# Patient Record
Sex: FEMALE | Race: WHITE | Employment: STUDENT | URBAN - METROPOLITAN AREA
[De-identification: names, ages, dates, MRNs, and addresses within clinical notes are randomized per-mention and may not be internally consistent; named-entity substitution may affect disease eponyms.]

---

## 2024-11-16 ENCOUNTER — HOSPITAL ENCOUNTER (EMERGENCY)
Facility: HOSPITAL | Age: 24
Discharge: HOME | End: 2024-11-17
Attending: EMERGENCY MEDICINE
Payer: COMMERCIAL

## 2024-11-16 ENCOUNTER — APPOINTMENT (OUTPATIENT)
Dept: GENERAL RADIOLOGY | Facility: HOSPITAL | Age: 24
End: 2024-11-16
Attending: EMERGENCY MEDICINE
Payer: COMMERCIAL

## 2024-11-16 DIAGNOSIS — E03.9 HYPOTHYROIDISM, UNSPECIFIED TYPE: ICD-10-CM

## 2024-11-16 DIAGNOSIS — I47.11 INAPPROPRIATE SINUS TACHYCARDIA (HCC): Primary | ICD-10-CM

## 2024-11-16 LAB
ALBUMIN SERPL-MCNC: 4.3 G/DL (ref 3.2–4.8)
ALBUMIN/GLOB SERPL: 1.3 {RATIO} (ref 1–2)
ALP LIVER SERPL-CCNC: 47 U/L
ALT SERPL-CCNC: 30 U/L
ANION GAP SERPL CALC-SCNC: 5 MMOL/L (ref 0–18)
AST SERPL-CCNC: 23 U/L (ref ?–34)
BASOPHILS # BLD AUTO: 0.07 X10(3) UL (ref 0–0.2)
BASOPHILS NFR BLD AUTO: 1 %
BILIRUB SERPL-MCNC: 0.3 MG/DL (ref 0.3–1.2)
BUN BLD-MCNC: 12 MG/DL (ref 9–23)
CALCIUM BLD-MCNC: 10 MG/DL (ref 8.7–10.4)
CHLORIDE SERPL-SCNC: 109 MMOL/L (ref 98–112)
CO2 SERPL-SCNC: 24 MMOL/L (ref 21–32)
CREAT BLD-MCNC: 0.65 MG/DL
D DIMER PPP FEU-MCNC: <0.27 UG/ML FEU (ref ?–0.5)
EGFRCR SERPLBLD CKD-EPI 2021: 126 ML/MIN/1.73M2 (ref 60–?)
EOSINOPHIL # BLD AUTO: 0.15 X10(3) UL (ref 0–0.7)
EOSINOPHIL NFR BLD AUTO: 2.1 %
ERYTHROCYTE [DISTWIDTH] IN BLOOD BY AUTOMATED COUNT: 12.8 %
GLOBULIN PLAS-MCNC: 3.3 G/DL (ref 2–3.5)
GLUCOSE BLD-MCNC: 98 MG/DL (ref 70–99)
HCT VFR BLD AUTO: 40.6 %
HGB BLD-MCNC: 13.6 G/DL
IMM GRANULOCYTES # BLD AUTO: 0.02 X10(3) UL (ref 0–1)
IMM GRANULOCYTES NFR BLD: 0.3 %
LYMPHOCYTES # BLD AUTO: 2.97 X10(3) UL (ref 1–4)
LYMPHOCYTES NFR BLD AUTO: 41.8 %
MCH RBC QN AUTO: 27.5 PG (ref 26–34)
MCHC RBC AUTO-ENTMCNC: 33.5 G/DL (ref 31–37)
MCV RBC AUTO: 82 FL
MONOCYTES # BLD AUTO: 0.66 X10(3) UL (ref 0.1–1)
MONOCYTES NFR BLD AUTO: 9.3 %
NEUTROPHILS # BLD AUTO: 3.23 X10 (3) UL (ref 1.5–7.7)
NEUTROPHILS # BLD AUTO: 3.23 X10(3) UL (ref 1.5–7.7)
NEUTROPHILS NFR BLD AUTO: 45.5 %
OSMOLALITY SERPL CALC.SUM OF ELEC: 286 MOSM/KG (ref 275–295)
PLATELET # BLD AUTO: 328 10(3)UL (ref 150–450)
POTASSIUM SERPL-SCNC: 4 MMOL/L (ref 3.5–5.1)
PROT SERPL-MCNC: 7.6 G/DL (ref 5.7–8.2)
RBC # BLD AUTO: 4.95 X10(6)UL
SODIUM SERPL-SCNC: 138 MMOL/L (ref 136–145)
TROPONIN I SERPL HS-MCNC: <3 NG/L
TSI SER-ACNC: 5.81 UIU/ML (ref 0.55–4.78)
WBC # BLD AUTO: 7.1 X10(3) UL (ref 4–11)

## 2024-11-16 PROCEDURE — 84484 ASSAY OF TROPONIN QUANT: CPT | Performed by: EMERGENCY MEDICINE

## 2024-11-16 PROCEDURE — 93010 ELECTROCARDIOGRAM REPORT: CPT

## 2024-11-16 PROCEDURE — 71045 X-RAY EXAM CHEST 1 VIEW: CPT | Performed by: EMERGENCY MEDICINE

## 2024-11-16 PROCEDURE — 80053 COMPREHEN METABOLIC PANEL: CPT | Performed by: EMERGENCY MEDICINE

## 2024-11-16 PROCEDURE — 36415 COLL VENOUS BLD VENIPUNCTURE: CPT

## 2024-11-16 PROCEDURE — 93005 ELECTROCARDIOGRAM TRACING: CPT

## 2024-11-16 PROCEDURE — 85379 FIBRIN DEGRADATION QUANT: CPT | Performed by: EMERGENCY MEDICINE

## 2024-11-16 PROCEDURE — 99284 EMERGENCY DEPT VISIT MOD MDM: CPT

## 2024-11-16 PROCEDURE — 84443 ASSAY THYROID STIM HORMONE: CPT | Performed by: EMERGENCY MEDICINE

## 2024-11-16 PROCEDURE — 99285 EMERGENCY DEPT VISIT HI MDM: CPT

## 2024-11-16 PROCEDURE — 85025 COMPLETE CBC W/AUTO DIFF WBC: CPT | Performed by: EMERGENCY MEDICINE

## 2024-11-16 PROCEDURE — 84439 ASSAY OF FREE THYROXINE: CPT | Performed by: EMERGENCY MEDICINE

## 2024-11-16 RX ORDER — DESIPRAMINE HYDROCHLORIDE 10 MG/1
10 TABLET ORAL NIGHTLY
COMMUNITY

## 2024-11-17 VITALS
SYSTOLIC BLOOD PRESSURE: 151 MMHG | OXYGEN SATURATION: 99 % | WEIGHT: 175 LBS | TEMPERATURE: 98 F | HEART RATE: 90 BPM | RESPIRATION RATE: 20 BRPM | DIASTOLIC BLOOD PRESSURE: 92 MMHG

## 2024-11-17 LAB
ATRIAL RATE: 112 BPM
P AXIS: 71 DEGREES
P-R INTERVAL: 128 MS
Q-T INTERVAL: 322 MS
QRS DURATION: 74 MS
QTC CALCULATION (BEZET): 439 MS
R AXIS: 50 DEGREES
T AXIS: 28 DEGREES
T4 FREE SERPL-MCNC: 1.3 NG/DL (ref 0.8–1.7)
THYROPEROXIDASE AB SERPL-ACNC: <28 U/ML (ref ?–60)
VENTRICULAR RATE: 112 BPM

## 2024-11-17 PROCEDURE — 86376 MICROSOMAL ANTIBODY EACH: CPT | Performed by: EMERGENCY MEDICINE

## 2024-11-17 NOTE — ED PROVIDER NOTES
Patient Seen in: [facility]      History     Chief Complaint   Patient presents with    Chest Pain Angina     Stated Complaint: chest pain    Subjective:   HPI      24-year-old female, presented with a primary complaint of a rapid heartbeat, which she has been experiencing for almost two years. She reported that her resting heart rate, when not on medication, typically ranges from 112 to 120 beats per minute. However, with medication, her average resting heart rate has been around 85 beats per minute. She has been working with cardiologists to determine the cause of her symptoms, but no definitive diagnosis has been made yet. She has been to the emergency room twice for this issue, and her thyroid levels were checked during those visits and found to be normal. Cher has been on various medications to manage her symptoms. She was on propranolol 60 mg for three months during the summer, which led to a weight gain of 20 pounds. She then switched to propranolol 25 mg twice a day, but experienced severe chest tightness and pain, leading to another ER visit. Currently, she is on metoprolol 12.5 mg twice a day. Despite the medication, she still feels her heart rate is high, particularly an hour or two after taking her night pills. She reported no abdominal pain but did mention occasional shortness of breath, typically when her heart rate is very high. However, there have been instances where she experienced chest tightness with a heart rate in the 90s. A new symptom she reported was a stabbing chest pain that recently started and has extended up into her neck, causing tingling down her arm. She also mentioned that she has been trying to lose weight for the past three months without success. She took her most recent dose of metoprolol at 9:30 on the day of the consultation.     Objective:     History reviewed. No pertinent past medical history.           History reviewed. No pertinent surgical  history.             Social History     Socioeconomic History    Marital status: Single   Tobacco Use    Smoking status: Never    Smokeless tobacco: Never                  Physical Exam     ED Triage Vitals [11/16/24 2253]   BP (!) 151/92   Pulse 108   Resp 18   Temp 98.1 °F (36.7 °C)   Temp src Temporal   SpO2 98 %   O2 Device None (Room air)       Current Vitals:   Vital Signs  BP: (!) 151/92  Pulse: 90  Resp: 20  Temp: 98.1 °F (36.7 °C)  Temp src: Temporal    Oxygen Therapy  SpO2: 99 %  O2 Device: None (Room air)        Physical Exam    Vital signs reviewed  General appearance: Patient is alert and in no acute distress  HEENT: Pupils equal react to light extraocular muscles intact no scleral icterus, mucous membranes are moist, there is no erythema or exudate in the posterior pharynx  Neck: Supple no JVD no lymphadenopathy no meningismus no carotid bruit  CV: Tachycardic, no murmur no rub   respiratory: Clear to auscultation bilaterally no crackles no wheezes no accessory muscle use  Abdomen: Soft nontender nondistended, no rebound no guarding  no hepatosplenomegaly bowel sounds are present , no pulsatile mass  Extremities: No clubbing cyanosis or edema 2+ distal pulses.  Neuro: Cranial nerves II through XII intact with no gross focal sensory or motor abnormality.      ED Course     Labs Reviewed   TSH W REFLEX TO FREE T4 - Abnormal; Notable for the following components:       Result Value    TSH 5.812 (*)     All other components within normal limits   COMP METABOLIC PANEL (14) - Normal   TROPONIN I HIGH SENSITIVITY - Normal   D-DIMER - Normal   T4, FREE (S) - Normal   CBC WITH DIFFERENTIAL WITH PLATELET   THYROID PEROXIDASE (TPO) AB   RAINBOW DRAW LAVENDER   RAINBOW DRAW LIGHT GREEN   RAINBOW DRAW BLUE     EKG    Rate, intervals and axes as noted on EKG Report.  Rate: 112  Rhythm: Sinus Rhythm  Reading: Sinus tachycardia                Patient was evaluated had a CBC chemistry D-dimer thyroid panel done.  Along  with a troponin.  Troponin was negative her TSH was at actually high at 5.18 and T4 was on the low range indicating probably subclinical hypothyroidism.  However this would not explain her tachycardia but may explain some weight loss issues.  She was monitored and her medicine muscle kicked in because her heart rate did come down to 90.  With her having a cardiology appointment on Monday I feel comfortable letting her go home at this point.  She agrees with plan.  She also will follow-up with endocrinologist after she sees cardiology as she may just have inappropriate sinus tachycardia as she does not seem to have POTS syndrome    No results found.    CXR      IMPRESSION:  -No evidence of pulmonary consolidation or pleural effusions     MDM      Differential diagnosis reflecting the complexity of care include: Inappropriate sinus tachycardia, POTS syndrome, pulmonary embolism, hyperthyroidism    My independent interpretation of studies of: Chest x-ray was unremarkable no consolidation or effusions.  Normal cardiac silhouette    Diagnostic tests and medications considered but not ordered were: Cardiac echo was considered but has an appoint with cardiology on Monday        Shared decision making was done by myself patient and her boyfriend.  Patient will be discharged home continue her beta-blocker drink plenty of fluids and follow-up with endocrinology after cardiology also awaiting TPO antibodies    Patient was screened and evaluated during this visit.  As the treating physician attending to the patient, I determined within reasonable clinical confidence and prior to discharge, that an emergency medical condition was not or was no longer present.  There was no indication for further evaluation, treatment, or admission on an emergency basis.  Comprehensive verbal and written discharge and follow-up instructions were provided to help prevent relapse or worsening.  Patient was instructed to follow-up with primary care  provider for further evaluation treatment, return immediately to ER for worsening, concerning, new, or changing/persisting symptoms.  I discussed the case with the patient and they had no questions, complaints, or concerns.  Patient was comfortable going home.      Dictation Disclaimer Note:   To increase efficiency this document may have been prepared using voice recognition technology. Every effort has been made to correct any errors made during preparation of this note. However, if a word or phrase is confusing, or does not make sense, this is likely due to a recognition error within the program which was not discovered during editing. Please do not hesitate to contact to address any significant errors.    Note to Patient:   The 21st Century Cures Act makes medical notes like these available to patients in the interest of transparency. Please be advised this is a medical document. Medical documents are intended to carry relevant information, facts as evident, and the clinical opinion of the practitioner. The medical note is intended as peer to peer communication and may appear blunt or direct. It is written in medical language and may contain abbreviations or verbiage that are unfamiliar.               MDM    Disposition and Plan     Clinical Impression:  1. Inappropriate sinus tachycardia (HCC)    2. Hypothyroidism, unspecified type         Disposition:  Discharge  11/17/2024 12:35 am    Follow-up:  Your cardiologist    Follow up            Medications Prescribed:  Discharge Medication List as of 11/17/2024 12:37 AM              Supplementary Documentation:

## 2024-11-17 NOTE — ED INITIAL ASSESSMENT (HPI)
Patient to the ER c/o left sided chest pain that radiates down left arm. Patient has been having tachycardia and arrhythmia taking metoprolol. Hx of having halter monitor due to go to cardiologist. Patient reports she was just sitting when pain began. No sob

## 2024-11-18 ENCOUNTER — ORDER TRANSCRIPTION (OUTPATIENT)
Dept: ADMINISTRATIVE | Facility: HOSPITAL | Age: 24
End: 2024-11-18

## 2024-11-18 DIAGNOSIS — R07.9 CHEST PAIN: Primary | ICD-10-CM

## 2024-11-18 DIAGNOSIS — R00.0 TACHYCARDIA: ICD-10-CM

## 2024-12-04 ENCOUNTER — HOSPITAL ENCOUNTER (OUTPATIENT)
Dept: CT IMAGING | Facility: HOSPITAL | Age: 24
Discharge: HOME | End: 2024-12-04
Attending: INTERNAL MEDICINE
Payer: COMMERCIAL

## 2024-12-04 VITALS
HEART RATE: 66 BPM | BODY MASS INDEX: 29.88 KG/M2 | SYSTOLIC BLOOD PRESSURE: 101 MMHG | WEIGHT: 175 LBS | DIASTOLIC BLOOD PRESSURE: 58 MMHG | HEIGHT: 64 IN

## 2024-12-04 DIAGNOSIS — R07.9 CHEST PAIN: ICD-10-CM

## 2024-12-04 DIAGNOSIS — R00.0 TACHYCARDIA: ICD-10-CM

## 2024-12-04 LAB — B-HCG UR QL: NEGATIVE

## 2024-12-04 PROCEDURE — 81025 URINE PREGNANCY TEST: CPT | Performed by: INTERNAL MEDICINE

## 2024-12-04 RX ORDER — METOPROLOL TARTRATE 25 MG/1
TABLET, FILM COATED ORAL
Status: COMPLETED
Start: 2024-12-04 | End: 2024-12-04

## 2024-12-04 RX ORDER — METOPROLOL TARTRATE 25 MG/1
100 TABLET, FILM COATED ORAL ONCE AS NEEDED
Status: COMPLETED | OUTPATIENT
Start: 2024-12-04 | End: 2024-12-04

## 2024-12-04 RX ORDER — METOPROLOL TARTRATE 25 MG/1
50 TABLET, FILM COATED ORAL ONCE AS NEEDED
Status: COMPLETED | OUTPATIENT
Start: 2024-12-04 | End: 2024-12-04

## 2024-12-04 RX ORDER — NITROGLYCERIN 0.4 MG/1
0.4 TABLET SUBLINGUAL ONCE
Status: DISCONTINUED | OUTPATIENT
Start: 2024-12-04 | End: 2024-12-06

## 2024-12-04 RX ORDER — METOPROLOL TARTRATE 1 MG/ML
5 INJECTION, SOLUTION INTRAVENOUS SEE ADMIN INSTRUCTIONS
Status: DISCONTINUED | OUTPATIENT
Start: 2024-12-04 | End: 2024-12-06

## 2024-12-04 RX ORDER — METOPROLOL TARTRATE 1 MG/ML
INJECTION, SOLUTION INTRAVENOUS
Status: DISCONTINUED
Start: 2024-12-04 | End: 2024-12-04

## 2024-12-04 RX ORDER — DILTIAZEM HYDROCHLORIDE 5 MG/ML
INJECTION INTRAVENOUS
Status: COMPLETED
Start: 2024-12-04 | End: 2024-12-04

## 2024-12-04 RX ORDER — DILTIAZEM HYDROCHLORIDE 5 MG/ML
5 INJECTION INTRAVENOUS SEE ADMIN INSTRUCTIONS
Status: DISCONTINUED | OUTPATIENT
Start: 2024-12-04 | End: 2024-12-06

## 2024-12-04 RX ADMIN — METOPROLOL TARTRATE 5 MG: 1 INJECTION, SOLUTION INTRAVENOUS at 12:10:00

## 2024-12-04 RX ADMIN — METOPROLOL TARTRATE 100 MG: 25 TABLET, FILM COATED ORAL at 10:15:00

## 2024-12-04 RX ADMIN — DILTIAZEM HYDROCHLORIDE 5 MG: 5 INJECTION INTRAVENOUS at 12:30:00

## 2024-12-04 RX ADMIN — METOPROLOL TARTRATE 5 MG: 1 INJECTION, SOLUTION INTRAVENOUS at 12:05:00

## 2024-12-04 RX ADMIN — DILTIAZEM HYDROCHLORIDE 5 MG: 5 INJECTION INTRAVENOUS at 12:45:00

## 2024-12-04 RX ADMIN — DILTIAZEM HYDROCHLORIDE 5 MG: 5 INJECTION INTRAVENOUS at 12:35:00

## 2024-12-04 RX ADMIN — METOPROLOL TARTRATE 5 MG: 1 INJECTION, SOLUTION INTRAVENOUS at 12:15:00

## 2024-12-04 RX ADMIN — DILTIAZEM HYDROCHLORIDE 5 MG: 5 INJECTION INTRAVENOUS at 12:40:00

## 2024-12-04 RX ADMIN — METOPROLOL TARTRATE 5 MG: 1 INJECTION, SOLUTION INTRAVENOUS at 12:20:00

## 2024-12-04 RX ADMIN — METOPROLOL TARTRATE 100 MG: 25 TABLET, FILM COATED ORAL at 11:00:00

## 2024-12-04 RX ADMIN — DILTIAZEM HYDROCHLORIDE 5 MG: 5 INJECTION INTRAVENOUS at 12:50:00

## 2024-12-04 NOTE — IMAGING NOTE
1000 TO RAD HOLDING,        HX TAKEN: ALG/MED/HX REVIEWED W/PT. NO C/O NO CARDIAC S/S @ THIS TIME   PROCEDURE DISCUSSED Q&A R/B -  PT CONSENTED     BASELINE VITAL SIGNS: HR 81 -86  /60 BMI 30    CTA ORDERED BY FRANCOIS  WAS PT GIVEN CTA PREMEDS YES - METOPROLOL 100 MG  HS/AM -LAST DOSE  @ 0900     1015 METOPROLOL PO GIVEN 100 MILLIGRAMS      1025 18 GAUGE IV STARTED   11/16  GFR = 126   CREATINE = 0.65  12/04/24 PREG TEST D/T PROTOCOL - NEG.    1100  HR 73 /63 METOPROLOL  MILLIGRAMS   GIVEN   1145 HR  67-71 BP 95/57  1150 HR 73 BP 94/62, NO PT C/O-DENIES D/L/HA, PT CALM/RELAXED   1152 0.9  ML IV BOLUS D/T BP PER PROTOCOL    DISCUSS W/PT NEXT STEP/MEDICATION Q& R/B, PT V/U & AGREES W/POC. PT CALM/RESTING.   1205  HR 64 /66  METOPROLOL 5 MILLIGRAMS GIVEN IV PUSH  SEE  PROTOCOL    1210 : HR 58-66 /63  METOPROLOL 5 MILLIGRAMS GIVEN IV PUSH     1215 : HR 64 /60  METOPROLOL 5 MILLIGRAMS  GIVEN IV PUSH    1220 : HR 63 /65  METOPROLOL 5 MILLIGRAMS  GIVEN IV PUSH    1225 DISCUSS W/PT NEXT STEP/MEDICATION Q& R/B, PT V/U & AGREES W/POC. PT CALM/RESTING.   1230 : HR 62-66 /65 CARDIZEM 5 MILLIGRAMS  GIVEN IV PUSH     1235 : HR 64=66  /66  CARDIZEM 5 MILLIGRAMS  GIVEN IV PUSH   Attempt breath hold - no change in HR.    1240: HR 67 BP  104/65 CARDIZEM 5 MILLIGRAMS  GIVEN IV PUSH     1245 HR 66-70  /57  CARDIZEM 5 MILLIGRAMS  GIVEN IV PUSH     1250 HR 66  /58   CARDIZEM 5 MILLIGRAMS  GIVEN IV PUSH   DOWN TO 58 W/ BREATH HOLD     1300 HR AVG 66  INCREASES TO 70 W/MOVEMENTS/ACTIVITY,  DOWN TO 59  OCCASIONAL W/ BREATH HOLD, PT CALM/RELAXED     1305 READING CARDIOLOGIST DR LOPEZ TO DISCUSS PT STATUS/POC.    1315 SPOKE W/DR BARBOSA - NOTIFIED PT STATUS - HR, MEDS GIVEN, BOLUS D/T LOW BP'S, BMI, PT CALM/RESTING.   PER MD -INSTRUCTED TO DISCUSS W/PT OPTIONS IF DESIRE TO CONT W/CTA POSSIBLE POOR IMAGE RESULT  D/T HR.   1326 PT ATTEMPTING TO CALL ORDERING MD TO  DISCUSS, PT CALM/RELAXED     1340 HR CONT. W/MID 60'S UP TO 70 W/ARM ABOVE HEAD POSITON (D/T FOR CT).  BREATH HOLD HR @ 66-70 ,    1341 PT MADE A DECISION TO CANCEL TEST D/T CHANCE OF POSSIBLE POOR IMAGING. IV DC'D. NO PT C/O,VSS. DENIES D/L.     1350  DISCHARGED HOME IN STABLE CONDITION. (PT INSTRUCTED TO F/U W/ORDERING MD. IF ANY CONCERNS OR ABN. S/S TO GO TO ER.  PT V/U. PT ACCOMPANIED BY S.O./.)

## 2024-12-16 RX ORDER — METOPROLOL TARTRATE 100 MG/1
100 TABLET ORAL AS DIRECTED
COMMUNITY
Start: 2024-11-18

## 2024-12-24 ENCOUNTER — HOSPITAL ENCOUNTER (OUTPATIENT)
Dept: CT IMAGING | Facility: HOSPITAL | Age: 24
Discharge: HOME | End: 2024-12-24
Attending: INTERNAL MEDICINE
Payer: COMMERCIAL

## 2024-12-24 VITALS
BODY MASS INDEX: 29.88 KG/M2 | RESPIRATION RATE: 17 BRPM | DIASTOLIC BLOOD PRESSURE: 68 MMHG | WEIGHT: 175 LBS | SYSTOLIC BLOOD PRESSURE: 116 MMHG | HEART RATE: 74 BPM | HEIGHT: 64 IN

## 2024-12-24 DIAGNOSIS — R07.9 CHEST PAIN: ICD-10-CM

## 2024-12-24 DIAGNOSIS — R00.0 TACHYCARDIA: ICD-10-CM

## 2024-12-24 LAB
CREAT BLD-MCNC: 0.6 MG/DL
EGFRCR SERPLBLD CKD-EPI 2021: 128 ML/MIN/1.73M2 (ref 60–?)

## 2024-12-24 PROCEDURE — 82565 ASSAY OF CREATININE: CPT

## 2024-12-24 PROCEDURE — 75574 CT ANGIO HRT W/3D IMAGE: CPT | Performed by: INTERNAL MEDICINE

## 2024-12-24 RX ORDER — METOPROLOL TARTRATE 25 MG/1
TABLET, FILM COATED ORAL
Status: COMPLETED
Start: 2024-12-24 | End: 2024-12-24

## 2024-12-24 RX ORDER — NITROGLYCERIN 0.4 MG/1
0.4 TABLET SUBLINGUAL ONCE
Status: COMPLETED | OUTPATIENT
Start: 2024-12-24 | End: 2024-12-24

## 2024-12-24 RX ORDER — METOPROLOL TARTRATE 25 MG/1
50 TABLET, FILM COATED ORAL ONCE AS NEEDED
Status: ACTIVE | OUTPATIENT
Start: 2024-12-24 | End: 2024-12-24

## 2024-12-24 RX ORDER — DILTIAZEM HYDROCHLORIDE 5 MG/ML
INJECTION INTRAVENOUS
Status: DISCONTINUED
Start: 2024-12-24 | End: 2024-12-24 | Stop reason: WASHOUT

## 2024-12-24 RX ORDER — METOPROLOL TARTRATE 1 MG/ML
5 INJECTION, SOLUTION INTRAVENOUS SEE ADMIN INSTRUCTIONS
Status: DISCONTINUED | OUTPATIENT
Start: 2024-12-24 | End: 2024-12-26

## 2024-12-24 RX ORDER — METOPROLOL TARTRATE 25 MG/1
100 TABLET, FILM COATED ORAL ONCE AS NEEDED
Status: COMPLETED | OUTPATIENT
Start: 2024-12-24 | End: 2024-12-24

## 2024-12-24 RX ORDER — DILTIAZEM HYDROCHLORIDE 5 MG/ML
5 INJECTION INTRAVENOUS ONCE
Status: DISCONTINUED | OUTPATIENT
Start: 2024-12-24 | End: 2024-12-26

## 2024-12-24 RX ORDER — METOPROLOL TARTRATE 25 MG/1
100 TABLET, FILM COATED ORAL ONCE AS NEEDED
Status: ACTIVE | OUTPATIENT
Start: 2024-12-24 | End: 2024-12-24

## 2024-12-24 RX ORDER — METOPROLOL TARTRATE 25 MG/1
50 TABLET, FILM COATED ORAL ONCE AS NEEDED
Status: COMPLETED | OUTPATIENT
Start: 2024-12-24 | End: 2024-12-24

## 2024-12-24 RX ORDER — DILTIAZEM HYDROCHLORIDE 5 MG/ML
5 INJECTION INTRAVENOUS SEE ADMIN INSTRUCTIONS
Status: DISCONTINUED | OUTPATIENT
Start: 2024-12-24 | End: 2024-12-26

## 2024-12-24 RX ORDER — DILTIAZEM HYDROCHLORIDE 5 MG/ML
INJECTION INTRAVENOUS
Status: COMPLETED
Start: 2024-12-24 | End: 2024-12-24

## 2024-12-24 RX ORDER — ALPRAZOLAM 0.25 MG/1
TABLET ORAL
Status: COMPLETED
Start: 2024-12-24 | End: 2024-12-24

## 2024-12-24 RX ORDER — ALPRAZOLAM 0.25 MG/1
0.25 TABLET ORAL ONCE AS NEEDED
Status: ACTIVE | OUTPATIENT
Start: 2024-12-24 | End: 2024-12-24

## 2024-12-24 RX ORDER — METOPROLOL TARTRATE 1 MG/ML
INJECTION, SOLUTION INTRAVENOUS
Status: COMPLETED
Start: 2024-12-24 | End: 2024-12-24

## 2024-12-24 RX ADMIN — DILTIAZEM HYDROCHLORIDE 5 MG: 5 INJECTION INTRAVENOUS at 10:49:00

## 2024-12-24 RX ADMIN — METOPROLOL TARTRATE 5 MG: 1 INJECTION, SOLUTION INTRAVENOUS at 10:08:00

## 2024-12-24 RX ADMIN — METOPROLOL TARTRATE 100 MG: 25 TABLET, FILM COATED ORAL at 08:24:00

## 2024-12-24 RX ADMIN — NITROGLYCERIN 0.4 MG: 0.4 TABLET SUBLINGUAL at 11:41:00

## 2024-12-24 RX ADMIN — METOPROLOL TARTRATE 100 MG: 25 TABLET, FILM COATED ORAL at 09:12:00

## 2024-12-24 RX ADMIN — ALPRAZOLAM: 0.25 TABLET ORAL at 08:24:00

## 2024-12-24 RX ADMIN — METOPROLOL TARTRATE 5 MG: 1 INJECTION, SOLUTION INTRAVENOUS at 10:24:00

## 2024-12-24 RX ADMIN — DILTIAZEM HYDROCHLORIDE 5 MG: 5 INJECTION INTRAVENOUS at 11:16:00

## 2024-12-24 RX ADMIN — DILTIAZEM HYDROCHLORIDE 5 MG: 5 INJECTION INTRAVENOUS at 10:57:00

## 2024-12-24 RX ADMIN — METOPROLOL TARTRATE 5 MG: 1 INJECTION, SOLUTION INTRAVENOUS at 10:33:00

## 2024-12-24 RX ADMIN — METOPROLOL TARTRATE 5 MG: 1 INJECTION, SOLUTION INTRAVENOUS at 10:16:00

## 2024-12-24 RX ADMIN — DILTIAZEM HYDROCHLORIDE 5 MG: 5 INJECTION INTRAVENOUS at 11:06:00

## 2024-12-24 RX ADMIN — DILTIAZEM HYDROCHLORIDE 5 MG: 5 INJECTION INTRAVENOUS at 10:43:00

## 2024-12-24 NOTE — IMAGING NOTE
TO RAD HOLDING AT  0810      HX TAKEN: CHEST PAIN AND TACHYCARDIA    PT CONSENTED AT 0823     BASELINE VITAL SIGNS: HR  74  /68 BMI 30    CTA ORDERED BY DR ULLOA   WAS PT GIVEN CTA PREMED: YES - PT TOOK 100 MG METOPROLOL TARTRATE  PO LAST NIGHT AND AGAIN THIS AM AND ALSO TOOK HER REGULARLY SCHEDULED DOSE OF METOPROLOL TARTRATE 12.5 MG THIS AM    METOPROLOL PO GIVEN 100 MILLIGRAMS  AT 0824    PT ALSO GIVEN XANAX 0.25 MG PO PER PT REQUEST.  PT HAS A  HOME.     0910:HR 81 /61   METOPROLOL PO GIVEN 100 MILLIGRAMS  AT 0912    18 GAUGE IV STARTED AT 0957 POC TESTING COMPLETED GFR = 128   CREATINE = 0.6    1008: HR 72 /63 METOPROLOL 5 MILLIGRAMS GIVEN IV PUSH  SEE  PROTOCOL    1016: HR 71-74 /65 METOPROLOL 5 MILLIGRAMS GIVEN IV PUSH     1024: HR 66-78 /66 METOPROLOL 5 MILLIGRAMS  GIVEN IV PUSH    1033: HR 68-75 /69 METOPROLOL 5 MILLIGRAMS  GIVEN IV PUSH    1043: HR 76 /66 CARDIZEM 5 MILLIGRAMS  GIVEN IV PUSH     1049: HR 68-73 /60 CARDIZEM 5 MILLIGRAMS  GIVEN IV PUSH     1057: HR 71 (67-85)  /60 CARDIZEM 5 MILLIGRAMS  GIVEN IV PUSH     1106: HR 68-79 /62 CARDIZEM 5 MILLIGRAMS  GIVEN IV PUSH     1116: HR 70 /58 CARDIZEM 5 MILLIGRAMS  GIVEN IV PUSH     1119:  D/W DR BARBOSA THAT THIS IS THE SECOND TIME FOR PT TO TRY TO GET HER HR DOWN FOR THIS TEST.  HE IS OK WITH SCANNING IN THE 60'S AND OK TO GIVE MORE CARDIZEM IVP IF NEEDED IF BP ALLOWS.    TO CT TABLE @ 1135    CONNECT TO MONITOR  HR 62-74 /62      NITROGLYCERIN 0.4 MILLIGRAMS SUBLINGUAL GIVEN AT 1138     CALCIUM SCORE COMPLETED AT 1141     INJECTION STARTED AT 1144 HR 66 DURING SCAN PROCEDURE COMPLETE    POST SCAN HR 71 /63 AT 1150    PT TO HOLDING AREA  VS Q 15 MIN X2   1155: HR 73 /66.  PT GIVEN MORE WATER TO DRINK.  1207: HR 73 BP 95/75 (MAP 79)  PT STATES FEELING WELL, NO DIZZINESS, WALKED AROUND AND FELT FINE.    AVS  PROVIDED      1215 DISCHARGED HOME

## 2024-12-24 NOTE — DISCHARGE INSTRUCTIONS
Computed Tomography Angiography (CTA)  Computed tomography angiography (CTA) is an imaging test. It uses X-rays and computer technology to make detailed pictures of your arteries (blood vessels). Before the test, an X-ray dye (contrast medium) is injected into your vein. The dye makes it easier to see your blood vessels on the X-ray. Pictures are then taken with the CT scanner. A computer turns the images into 2-D and 3-D pictures.      CTA can make 3D images, such as the carotid arteries shown here.     Why CTA is done  CTA may be used to:  Check arteries in your belly, neck, lungs, pelvis, kidneys, or brain.  Look for a ballooning of the blood vessel wall (aneurysm) or a tear (dissection).  Check if a tube (stent) used to keep an artery open is working well.  Find damage to your arteries due to injuries.  Collect details on blood vessels that supply blood to tumors.  Getting ready for your test  Tell your healthcare provider if you:   Have diabetes  Have kidney disease  Are allergic to X-ray dye or other medicines  Are pregnant, think you may be pregnant, or are breastfeeding  Are taking any medicines, herbs, or supplements, including prescription medicines, illegal drugs, and over-the-counter medicines such as aspirin or ibuprofen  Follow any directions you are given for not eating or drinking before the CTA. Follow any other instructions from your healthcare provider.   During your test  You will be asked to remove any hair clips, jewelry, false teeth, or other metal items that could show up on the X-ray.  You will lie down on the scanning table. An IV line will be put in a vein in your arm or hand.  The scanning table will be properly placed. The part of your body being checked will be inside the doughnut-shaped CT scanner.  One image may be taken first to be sure you are in the proper position for the test.  The IV will be hooked up to an automatic injection machine. This controls how often and how fast the  X-ray dye is injected. The injection may continue during part of the exam.  The dye will be put into your vein through the IV line. You may feel warmth through your body when the dye is injected.  You can’t move while the X-rays are being taken. Pillows and foam pads may be used to help you stay still. You will be told to hold your breath for 10 to 25 seconds at a time.  A single scan may take several minutes. You may need more than one scan.    After your test  Drink plenty of fluids to help flush the X-ray dye from your body.  You may eat as soon as you want to.    Possible risks  All procedures have some risks. A CTA has some possible risks. These include:   Problems due to the X-ray dye, such as an allergic reaction or kidney damage  Skin damage from leaking X-ray dye near where the IV was put in  [employer] last reviewed this educational content on 8/1/2022  © 0402-3610 [employer]. All rights reserved. This information is not intended as a substitute for professional medical care. Always follow your healthcare professional's instructions.

## 2025-02-11 ENCOUNTER — HOSPITAL ENCOUNTER (OUTPATIENT)
Dept: MRI IMAGING | Age: 25
Discharge: HOME | End: 2025-02-11
Attending: INTERNAL MEDICINE
Payer: COMMERCIAL

## 2025-02-11 ENCOUNTER — LAB ENCOUNTER (OUTPATIENT)
Dept: LAB | Age: 25
End: 2025-02-11
Attending: INTERNAL MEDICINE
Payer: COMMERCIAL

## 2025-02-11 DIAGNOSIS — K76.9 LIVER LESION: ICD-10-CM

## 2025-02-11 DIAGNOSIS — Z11.59 SCREENING EXAMINATION FOR POLIOMYELITIS: ICD-10-CM

## 2025-02-11 DIAGNOSIS — K76.9 LIVER LESION: Primary | ICD-10-CM

## 2025-02-11 DIAGNOSIS — K76.0 FATTY METAMORPHOSIS OF LIVER: ICD-10-CM

## 2025-02-11 DIAGNOSIS — Z11.59 SCREENING FOR VIRAL DISEASE: ICD-10-CM

## 2025-02-11 LAB
ALBUMIN SERPL-MCNC: 4.8 G/DL (ref 3.2–4.8)
ALBUMIN/GLOB SERPL: 1.8 {RATIO} (ref 1–2)
ALP LIVER SERPL-CCNC: 51 U/L
ALT SERPL-CCNC: 27 U/L
ANION GAP SERPL CALC-SCNC: 10 MMOL/L (ref 0–18)
AST SERPL-CCNC: 22 U/L (ref ?–34)
BILIRUB SERPL-MCNC: 0.5 MG/DL (ref 0.3–1.2)
BUN BLD-MCNC: 10 MG/DL (ref 9–23)
CALCIUM BLD-MCNC: 10 MG/DL (ref 8.7–10.6)
CHLORIDE SERPL-SCNC: 102 MMOL/L (ref 98–112)
CO2 SERPL-SCNC: 26 MMOL/L (ref 21–32)
CREAT BLD-MCNC: 0.64 MG/DL
EGFRCR SERPLBLD CKD-EPI 2021: 126 ML/MIN/1.73M2 (ref 60–?)
FASTING STATUS PATIENT QL REPORTED: YES
GLOBULIN PLAS-MCNC: 2.7 G/DL (ref 2–3.5)
GLUCOSE BLD-MCNC: 81 MG/DL (ref 70–99)
HAV AB SER QL IA: REACTIVE
HAV IGM SER QL: NONREACTIVE
HBV CORE AB SERPL QL IA: NONREACTIVE
HBV SURFACE AB SER QL: REACTIVE
HBV SURFACE AB SERPL IA-ACNC: 121.49 MIU/ML
HBV SURFACE AG SER-ACNC: 0.1 [IU]/L
HBV SURFACE AG SERPL QL IA: NONREACTIVE
HCV AB SERPL QL IA: NONREACTIVE
OSMOLALITY SERPL CALC.SUM OF ELEC: 284 MOSM/KG (ref 275–295)
POTASSIUM SERPL-SCNC: 4.2 MMOL/L (ref 3.5–5.1)
PROT SERPL-MCNC: 7.5 G/DL (ref 5.7–8.2)
SODIUM SERPL-SCNC: 138 MMOL/L (ref 136–145)

## 2025-02-11 PROCEDURE — 86709 HEPATITIS A IGM ANTIBODY: CPT

## 2025-02-11 PROCEDURE — 74183 MRI ABD W/O CNTR FLWD CNTR: CPT | Performed by: INTERNAL MEDICINE

## 2025-02-11 PROCEDURE — 86708 HEPATITIS A ANTIBODY: CPT

## 2025-02-11 PROCEDURE — 36415 COLL VENOUS BLD VENIPUNCTURE: CPT

## 2025-02-11 PROCEDURE — 86803 HEPATITIS C AB TEST: CPT

## 2025-02-11 PROCEDURE — 80053 COMPREHEN METABOLIC PANEL: CPT

## 2025-02-11 PROCEDURE — 86704 HEP B CORE ANTIBODY TOTAL: CPT

## 2025-02-11 PROCEDURE — 86706 HEP B SURFACE ANTIBODY: CPT

## 2025-02-11 PROCEDURE — A9575 INJ GADOTERATE MEGLUMI 0.1ML: HCPCS | Performed by: RADIOLOGY

## 2025-02-11 PROCEDURE — 87340 HEPATITIS B SURFACE AG IA: CPT

## 2025-02-11 RX ORDER — GADOTERATE MEGLUMINE 376.9 MG/ML
20 INJECTION INTRAVENOUS
Status: COMPLETED | OUTPATIENT
Start: 2025-02-11 | End: 2025-02-11

## 2025-02-11 RX ADMIN — GADOTERATE MEGLUMINE 17 ML: 376.9 INJECTION INTRAVENOUS at 14:41:00

## 2025-03-28 ENCOUNTER — OFFICE VISIT (OUTPATIENT)
Dept: PULMONOLOGY | Facility: CLINIC | Age: 25
End: 2025-03-28

## 2025-03-28 VITALS
OXYGEN SATURATION: 98 % | HEART RATE: 97 BPM | BODY MASS INDEX: 29.94 KG/M2 | RESPIRATION RATE: 16 BRPM | WEIGHT: 175.38 LBS | SYSTOLIC BLOOD PRESSURE: 90 MMHG | DIASTOLIC BLOOD PRESSURE: 58 MMHG | HEIGHT: 64 IN

## 2025-03-28 DIAGNOSIS — R93.89 ABNORMAL X-RAY: Primary | ICD-10-CM

## 2025-03-28 DIAGNOSIS — R05.9 COUGH, UNSPECIFIED TYPE: ICD-10-CM

## 2025-03-28 PROCEDURE — 3078F DIAST BP <80 MM HG: CPT | Performed by: INTERNAL MEDICINE

## 2025-03-28 PROCEDURE — 3008F BODY MASS INDEX DOCD: CPT | Performed by: INTERNAL MEDICINE

## 2025-03-28 PROCEDURE — 3074F SYST BP LT 130 MM HG: CPT | Performed by: INTERNAL MEDICINE

## 2025-03-28 PROCEDURE — 99204 OFFICE O/P NEW MOD 45 MIN: CPT | Performed by: INTERNAL MEDICINE

## 2025-03-28 NOTE — H&P
[facility], [address], [place]    Pulmonary consult     Violet Sandy Russell Patient Status:  Specimen    2000 MRN [id number]   Location [facility], [address], [place] Attending No att. providers found   Hosp Day # 0 PCP None Pcp     Date:  3/28/2025    History provided by:patient  HPI:     Chief Complaint   Patient presents with    New Patient     CAT Scan results    Dyspnea     While sitting, with exertion, climbing stairs  Chest pressure that comes and goes       HPI    24-year-old female with a history of chronic cough and chronic recurrent chest pain and dyspnea upon exertion when she had workup in the past and inhaler did not work and then started on desipramine for likely psychogenic cough and reported improvement  Recently presented to ER for chest pain and she had cardiac CT which reported nonspecific minimal subsegmental atelectasis and presented today to evaluate this issue    Denied history of smoking or asthma  Occasional chronic episode of cough and inhaler did not help   No wheezes or sputum  Claimed dyspnea upon exertion  No sinus pressure or nasal congestion  No significant postnasal drip or acid reflux    History     Past Medical History:    Fatty liver disease, nonalcoholic    Lesion of liver    Tachycardia    abnormal tachycardia     History reviewed. No pertinent surgical history.  History reviewed. No pertinent family history.  Social History:  Social History     Socioeconomic History    Marital status: Single   Tobacco Use    Smoking status: Never    Smokeless tobacco: Never     Social Drivers of Health     Food Insecurity: No Food Insecurity (2025)    Received from [facility]    Hunger Vital Sign     Worried About Running Out of Food in the Last Year: Never true     Ran Out of Food in the Last Year: Never true   Transportation Needs: No Transportation Needs (2025)    Received from [facility]     PRAPARE - Transportation     Lack of Transportation (Medical): No     Lack of Transportation (Non-Medical): No   Housing Stability: Unknown (2/20/2025)    Received from [facility]    Housing Stability Vital Sign     Unable to Pay for Housing in the Last Year: No     Allergies/Medications:   Allergies: Allergies[1]  (Not in a hospital admission)      Review of Systems:     Constitutional: Negative.    HENT:  Negative for congestion.    Gastrointestinal: Negative.    Musculoskeletal:  Negative for joint pain.   Skin: Negative.    Neurological:  Negative for seizures.   Psychiatric/Behavioral:  Negative for agitation.        Physical Exam:   Vital Signs:  Blood pressure 90/58, pulse 97, resp. rate 16, height 5' 4\" (1.626 m), weight 175 lb 6.4 oz (79.6 kg), last menstrual period 11/16/2024, SpO2 98%.  Physical Exam  Constitutional:       General: She is not in acute distress.     Appearance: Normal appearance. She is not ill-appearing.   HENT:      Head: Normocephalic and atraumatic.      Nose: Nose normal.      Mouth/Throat:      Mouth: Mucous membranes are moist.   Eyes:      General: No scleral icterus.     Pupils: Pupils are equal, round, and reactive to light.   Cardiovascular:      Rate and Rhythm: Normal rate.      Heart sounds: No murmur heard.     No gallop.   Pulmonary:      Effort: No respiratory distress.      Breath sounds: No stridor. No wheezing, rhonchi or rales.   Chest:      Chest wall: No tenderness.   Abdominal:      General: Abdomen is flat. Bowel sounds are normal. There is no distension.      Palpations: Abdomen is soft. There is no mass.      Tenderness: There is no guarding.   Musculoskeletal:      Right lower leg: No edema.      Left lower leg: No edema.   Lymphadenopathy:      Cervical: No cervical adenopathy.   Skin:     General: Skin is dry.   Neurological:      Mental Status: She is oriented to person, place, and time.           Results:     Lab Results   Component  Value Date    WBC 7.1 11/16/2024    HGB 13.6 11/16/2024    HCT 40.6 11/16/2024    .0 11/16/2024    CREATSERUM 0.64 02/11/2025    BUN 10 02/11/2025     02/11/2025    K 4.2 02/11/2025     02/11/2025    CO2 26.0 02/11/2025    GLU 81 02/11/2025    CA 10.0 02/11/2025    ALB 4.8 02/11/2025    ALKPHO 51 02/11/2025    BILT 0.5 02/11/2025    TP 7.5 02/11/2025    AST 22 02/11/2025    ALT 27 02/11/2025    T4F 1.3 11/16/2024    TSH 5.812 (H) 11/16/2024    DDIMER <0.27 11/16/2024    TROPHS <3 11/16/2024     Cardiac CT over reading / reviewed independently    FINDINGS:    CARDIAC: Please see the cardiologist report for further details.  VASCULATURE: The visualized portions of the thoracic aorta are grossly unremarkable in appearance.    LUNGS/PLEURA: No airspace consolidation, pleural effusion, or pneumothorax is detected in the imaged region.  There is dependent subsegmental atelectasis bilaterally.  Tiny 2 mm subpleural micronodule in the right lower lobe (series 45, image 20).  When  considering small size and patient age, this is almost certainly benign/incidental.  AIRWAYS: The distal airways appear grossly unremarkable.  MEDIASTINUM/GAVIN: Triangular soft tissue with predominantly interspersed fat attenuation likely reflects residual thymus.  CHEST WALL: No gross abnormalities are detected in the imaged volume.    LIMITED ABDOMEN: Ovoid 3.6 x 2.7 cm enhancing lesion in the hepatic dome.  BONES: No bony lesion or fracture is seen.    OTHER: Negative.       Please see separate report for CTA Coronary Arteries.               Impression   CONCLUSION:  1. Cardiac CT over read performed.  2. No evidence of acute extracardiac intrathoracic abnormality.  3. There is a 3.6 x 2.7 cm enhancing lesion in the hepatic dome.  Although this likely represents a benign hemangioma, a follow-up liver protocol MRI of the abdomen without and with contrast is recommended for definitive characterization.  4. Lesser incidental  findings as above.           Assessment/Plan:     1-reported nonspecific subsegmental atelectasis on cardiac CT on 12/20/2024  I reviewed the CT and looked normal to me  Nonspecific finding  No history of smoking  Normal lung exam  Reassurance and no need for further follow-up imaging    2-chronic cough treated in the past for possible neurogenic cough with desipramine /claimed improvement  Inhaler did not help  Normal lung exam  No history of smoking  Doubt asthma  Will follow-up PFTs      F/u in 6 months                Damián Cason MD  3/28/2025         [1]   Allergies  Allergen Reactions    Amoxicillin HIVES    Percocet [Perloxx] HIVES    Ivabradine RASH

## 2025-04-24 ENCOUNTER — RT VISIT (OUTPATIENT)
Dept: RESPIRATORY THERAPY | Facility: HOSPITAL | Age: 25
End: 2025-04-24
Attending: INTERNAL MEDICINE
Payer: COMMERCIAL

## 2025-04-24 DIAGNOSIS — R05.9 COUGH, UNSPECIFIED TYPE: ICD-10-CM

## 2025-04-24 PROCEDURE — 94729 DIFFUSING CAPACITY: CPT

## 2025-04-24 PROCEDURE — 94726 PLETHYSMOGRAPHY LUNG VOLUMES: CPT

## 2025-04-24 PROCEDURE — 94010 BREATHING CAPACITY TEST: CPT

## 2025-04-28 NOTE — PROCEDURES
Findings:  FEV1 is 3.65L, z-score of 1.39.  FVC is 4.52L, z-score of 1.96.  FEV1/ FVC ratio is 0.81.  The flow-volume loop demonstrates a normal pattern.  The TLC is 5.85L, z-score of 1.24.  The residual volume 1.33L, z-score of 0.50.  The diffusion capacity is 20.01 with z-score of -0.73. When corrected for alveolar volume, the diffusion capacity is 4.84 with z-score of 0.22.  Impression:  There is no airway obstruction on spirometry and visualized on flow-volume loop.  Lung volumes are normal.  Diffusion capacity is normal.  The above testing demonstrates normal pulmonary function. If there is clinical concern to exclude asthma or reactive airway syndrome, consider further assessment with bronchoprovocation challenge testing (such as methachloline challenge).  There are no previous pulmonary function tests available for comparison.   Of note, this testing was performed in accordance to ATS/ERS interpretation guidelines with the use of upper and lower limits of normal as well as z-score references. Previous testing (before March 2024) was not performed at [place] using z-scores and so comparison to previous testing should be taken with caution.

## 2025-08-11 ENCOUNTER — HOSPITAL ENCOUNTER (OUTPATIENT)
Dept: MRI IMAGING | Facility: HOSPITAL | Age: 25
Discharge: HOME | End: 2025-08-11
Attending: INTERNAL MEDICINE

## 2025-08-11 DIAGNOSIS — K76.89 FLOATING LIVER: ICD-10-CM

## 2025-08-11 DIAGNOSIS — K76.0 FATTY METAMORPHOSIS OF LIVER: ICD-10-CM

## 2025-08-11 PROCEDURE — A9575 INJ GADOTERATE MEGLUMI 0.1ML: HCPCS | Performed by: RADIOLOGY

## 2025-08-11 PROCEDURE — 74183 MRI ABD W/O CNTR FLWD CNTR: CPT | Performed by: INTERNAL MEDICINE

## 2025-08-11 RX ORDER — GADOTERATE MEGLUMINE 376.9 MG/ML
20 INJECTION INTRAVENOUS
Status: COMPLETED | OUTPATIENT
Start: 2025-08-11 | End: 2025-08-11

## 2025-08-11 RX ADMIN — GADOTERATE MEGLUMINE 17 ML: 376.9 INJECTION INTRAVENOUS at 18:54:00
